# Patient Record
Sex: FEMALE | Race: OTHER | URBAN - METROPOLITAN AREA
[De-identification: names, ages, dates, MRNs, and addresses within clinical notes are randomized per-mention and may not be internally consistent; named-entity substitution may affect disease eponyms.]

---

## 2024-03-21 ENCOUNTER — LAB (OUTPATIENT)
Dept: URBAN - METROPOLITAN AREA CLINIC 105 | Facility: CLINIC | Age: 54
End: 2024-03-21

## 2024-03-21 ENCOUNTER — OV NP (OUTPATIENT)
Dept: URBAN - METROPOLITAN AREA CLINIC 105 | Facility: CLINIC | Age: 54
End: 2024-03-21
Payer: COMMERCIAL

## 2024-03-21 VITALS
SYSTOLIC BLOOD PRESSURE: 129 MMHG | WEIGHT: 110.6 LBS | HEART RATE: 78 BPM | DIASTOLIC BLOOD PRESSURE: 82 MMHG | BODY MASS INDEX: 18.88 KG/M2 | HEIGHT: 64 IN | TEMPERATURE: 97.2 F

## 2024-03-21 DIAGNOSIS — M35.00 SJOGREN'S SYNDROME, WITH UNSPECIFIED ORGAN INVOLVEMENT: ICD-10-CM

## 2024-03-21 DIAGNOSIS — J44.9 COPD WITHOUT EXACERBATION: ICD-10-CM

## 2024-03-21 DIAGNOSIS — R19.5 LOOSE STOOLS: ICD-10-CM

## 2024-03-21 DIAGNOSIS — Z87.42 HISTORY OF PCOS: ICD-10-CM

## 2024-03-21 DIAGNOSIS — Z12.11 SCREENING FOR COLON CANCER: ICD-10-CM

## 2024-03-21 DIAGNOSIS — D50.9 IRON DEFICIENCY ANEMIA, UNSPECIFIED IRON DEFICIENCY ANEMIA TYPE: ICD-10-CM

## 2024-03-21 PROBLEM — 13645005: Status: ACTIVE | Noted: 2024-03-21

## 2024-03-21 PROBLEM — 87522002: Status: ACTIVE | Noted: 2024-03-21

## 2024-03-21 PROBLEM — 83901003: Status: ACTIVE | Noted: 2024-03-21

## 2024-03-21 PROCEDURE — 99244 OFF/OP CNSLTJ NEW/EST MOD 40: CPT

## 2024-03-21 NOTE — HPI-TODAY'S VISIT:
New patient 54 yo female with PMH of SLE, sjorgen's syndrome, PCOS, COPD (never hospitalized or intubated), and open-angle glaucoma referred by  PCP Dr. Jaime Jara. The patient presents for a colon cancer screening. There is no known family history of colon polyps or colon cancer. Patient admits to change in bowel habits of looser, more frequent stools lately. Denies changes in appetite and weight. Patient denies rectal bleeding. Patient denies any cardiac or kidney problems. Patient denies any digestive symptoms currently. Last colonoscopy: never had one before, she states she had a flex sigmoid in 2004 at [place] Has a hx of ALEX with an unknown etiology. She is currently perimenopausal. [facility] and gets iron infusions as needed. She is also having B12 injections. Last CBC 02/15/2024 Hgb 11.3 HCT 36.6  MCV 88.8

## 2024-03-22 LAB
ABSOLUTE BASOPHILS: 21
ABSOLUTE EOSINOPHILS: 32
ABSOLUTE LYMPHOCYTES: 889
ABSOLUTE MONOCYTES: 392
ABSOLUTE NEUTROPHILS: 2167
BASOPHILS: 0.6
EOSINOPHILS: 0.9
FERRITIN, SERUM: 109
HEMATOCRIT: 36.7
HEMOGLOBIN: 12
IRON BIND.CAP.(TIBC): 329
IRON SATURATION: 34
IRON: 113
LYMPHOCYTES: 25.4
MCH: 27
MCHC: 32.7
MCV: 82.7
MONOCYTES: 11.2
MPV: 10.6
NEUTROPHILS: 61.9
PLATELET COUNT: 184
RDW: 12
RED BLOOD CELL COUNT: 4.44
WHITE BLOOD CELL COUNT: 3.5

## 2024-04-17 ENCOUNTER — COLON (OUTPATIENT)
Dept: URBAN - METROPOLITAN AREA SURGERY CENTER 16 | Facility: SURGERY CENTER | Age: 54
End: 2024-04-17
Payer: COMMERCIAL

## 2024-04-17 ENCOUNTER — LAB (OUTPATIENT)
Dept: URBAN - METROPOLITAN AREA CLINIC 4 | Facility: CLINIC | Age: 54
End: 2024-04-17
Payer: COMMERCIAL

## 2024-04-17 DIAGNOSIS — K63.89 OTHER SPECIFIED DISEASES OF INTESTINE: ICD-10-CM

## 2024-04-17 DIAGNOSIS — Z12.11 COLON CANCER SCREENING: ICD-10-CM

## 2024-04-17 DIAGNOSIS — K63.89 MELANOSIS COLI: ICD-10-CM

## 2024-04-17 PROBLEM — 724556004: Status: ACTIVE | Noted: 2024-04-17

## 2024-04-17 PROCEDURE — 45380 COLONOSCOPY AND BIOPSY: CPT | Performed by: INTERNAL MEDICINE

## 2024-04-17 PROCEDURE — 88305 TISSUE EXAM BY PATHOLOGIST: CPT | Performed by: PATHOLOGY

## 2024-05-07 ENCOUNTER — OFFICE VISIT (OUTPATIENT)
Dept: URBAN - METROPOLITAN AREA SURGERY CENTER 16 | Facility: SURGERY CENTER | Age: 54
End: 2024-05-07

## 2024-05-31 ENCOUNTER — TELEPHONE ENCOUNTER (OUTPATIENT)
Dept: URBAN - METROPOLITAN AREA CLINIC 17 | Facility: CLINIC | Age: 54
End: 2024-05-31

## 2024-05-31 ENCOUNTER — OFFICE VISIT (OUTPATIENT)
Dept: URBAN - METROPOLITAN AREA SURGERY CENTER 16 | Facility: SURGERY CENTER | Age: 54
End: 2024-05-31

## 2024-05-31 RX ORDER — PANTOPRAZOLE SODIUM 40 MG/1
1 TABLET TABLET, DELAYED RELEASE ORAL ONCE A DAY
Qty: 90 TABLET | Refills: 1 | OUTPATIENT
Start: 2024-05-31

## 2024-06-20 ENCOUNTER — DASHBOARD ENCOUNTERS (OUTPATIENT)
Age: 54
End: 2024-06-20

## 2024-06-20 ENCOUNTER — OFFICE VISIT (OUTPATIENT)
Dept: URBAN - METROPOLITAN AREA CLINIC 105 | Facility: CLINIC | Age: 54
End: 2024-06-20
Payer: COMMERCIAL

## 2024-06-20 VITALS
TEMPERATURE: 97.6 F | SYSTOLIC BLOOD PRESSURE: 128 MMHG | HEART RATE: 85 BPM | HEIGHT: 64 IN | DIASTOLIC BLOOD PRESSURE: 85 MMHG | WEIGHT: 118 LBS | BODY MASS INDEX: 20.14 KG/M2

## 2024-06-20 DIAGNOSIS — M35.08 SJOGREN'S SYNDROME WITH GASTROINTESTINAL INVOLVEMENT: ICD-10-CM

## 2024-06-20 DIAGNOSIS — R19.4 CHANGE IN BOWEL HABITS: ICD-10-CM

## 2024-06-20 DIAGNOSIS — K29.51 OTHER CHRONIC GASTRITIS WITH HEMORRHAGE: ICD-10-CM

## 2024-06-20 DIAGNOSIS — D50.0 IRON DEFICIENCY ANEMIA DUE TO CHRONIC BLOOD LOSS: ICD-10-CM

## 2024-06-20 DIAGNOSIS — K21.9 CHRONIC GERD: ICD-10-CM

## 2024-06-20 PROBLEM — 235595009: Status: ACTIVE | Noted: 2024-06-20

## 2024-06-20 PROBLEM — 8493009: Status: ACTIVE | Noted: 2024-06-20

## 2024-06-20 PROCEDURE — 99214 OFFICE O/P EST MOD 30 MIN: CPT | Performed by: INTERNAL MEDICINE

## 2024-06-20 RX ORDER — PANTOPRAZOLE SODIUM 40 MG/1
1 TABLET TABLET, DELAYED RELEASE ORAL ONCE A DAY
Qty: 90 TABLET | Refills: 1 | Status: ACTIVE | COMMUNITY
Start: 2024-05-31

## 2024-06-20 NOTE — HPI-TODAY'S VISIT:
The patient has a history of chronic constipatoin, Sjogren's syndrome and dietary indiscretion who presents for f/u ov. The pt is s/p E/C 4/2024 + gastritis and melanosis coli, The pt has taken laxatives in the past and she has struggled with poor decsions in the past. She has noted increased c onstipation and bloating as well.   The pt's time of visiti DOS is 35 minutes after review of the old records and op notesl

## 2024-11-21 ENCOUNTER — OFFICE VISIT (OUTPATIENT)
Dept: URBAN - METROPOLITAN AREA CLINIC 105 | Facility: CLINIC | Age: 54
End: 2024-11-21

## 2024-11-21 RX ORDER — PANTOPRAZOLE SODIUM 40 MG/1
1 TABLET TABLET, DELAYED RELEASE ORAL ONCE A DAY
Qty: 90 TABLET | Refills: 1 | COMMUNITY
Start: 2024-05-31

## 2024-12-16 ENCOUNTER — OFFICE VISIT (OUTPATIENT)
Dept: URBAN - METROPOLITAN AREA CLINIC 105 | Facility: CLINIC | Age: 54
End: 2024-12-16
Payer: COMMERCIAL

## 2024-12-16 VITALS
BODY MASS INDEX: 19.46 KG/M2 | HEART RATE: 76 BPM | DIASTOLIC BLOOD PRESSURE: 87 MMHG | TEMPERATURE: 97.2 F | SYSTOLIC BLOOD PRESSURE: 119 MMHG | WEIGHT: 114 LBS | HEIGHT: 64 IN

## 2024-12-16 DIAGNOSIS — R19.4 CHANGE IN BOWEL HABITS: ICD-10-CM

## 2024-12-16 DIAGNOSIS — K21.00 GASTROESOPHAGEAL REFLUX DISEASE WITH ESOPHAGITIS, UNSPECIFIED WHETHER HEMORRHAGE: ICD-10-CM

## 2024-12-16 DIAGNOSIS — D50.0 IRON DEFICIENCY ANEMIA DUE TO CHRONIC BLOOD LOSS: ICD-10-CM

## 2024-12-16 PROBLEM — 266433003: Status: ACTIVE | Noted: 2024-12-16

## 2024-12-16 PROCEDURE — 99213 OFFICE O/P EST LOW 20 MIN: CPT | Performed by: INTERNAL MEDICINE

## 2024-12-16 RX ORDER — PANTOPRAZOLE SODIUM 40 MG/1
1 TABLET TABLET, DELAYED RELEASE ORAL ONCE A DAY
Qty: 90 TABLET | Refills: 1 | Status: DISCONTINUED | COMMUNITY
Start: 2024-05-31

## 2024-12-16 NOTE — HPI-TODAY'S VISIT:
8/2024 Fifi The patient has a history of chronic constipatoin, Sjogren's syndrome and dietary indiscretion who presents for f/u ov. The pt is s/p E/C 4/2024 + gastritis and melanosis coli, The pt has taken laxatives in the past and she has struggled with poor decsions in the past. She has noted increased c onstipation and bloating as well.   The pt's time of visiti DOS is 35 minutes after review of the old records and op notesl  12/16/24 55 yo lady here for f.u Wants to switch MDs , has seen DR Calix " I didnt do anything he told me to do" Reviewed colon/SBE done this year - melanosis, LA grade A esophagitis, bx chronic gastritis.  Saw her PCP and says she felt an odor to her BMs and urine. This was before her colonoscopy/SBE. Says BMs smelt like she was ill 'at the time we were in [employer] housing and there was mold. we were eating some spoiled food".  She says she has seen some improvement Has regular BMs, no blood in stool Trying to eat more fiber and getting some exercise No reflux at all. not taking PPI. has in the past.